# Patient Record
Sex: FEMALE | Race: WHITE | NOT HISPANIC OR LATINO | Employment: FULL TIME | URBAN - METROPOLITAN AREA
[De-identification: names, ages, dates, MRNs, and addresses within clinical notes are randomized per-mention and may not be internally consistent; named-entity substitution may affect disease eponyms.]

---
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
## 2025-01-01 ENCOUNTER — HOSPITAL ENCOUNTER (EMERGENCY)
Facility: HOSPITAL | Age: 20
Discharge: HOME | End: 2025-01-01
Attending: EMERGENCY MEDICINE
Payer: MEDICAID

## 2025-01-01 VITALS
DIASTOLIC BLOOD PRESSURE: 59 MMHG | BODY MASS INDEX: 25.71 KG/M2 | RESPIRATION RATE: 18 BRPM | HEIGHT: 66 IN | OXYGEN SATURATION: 99 % | SYSTOLIC BLOOD PRESSURE: 103 MMHG | HEART RATE: 67 BPM | WEIGHT: 160 LBS | TEMPERATURE: 98 F

## 2025-01-01 DIAGNOSIS — N30.00 ACUTE CYSTITIS WITHOUT HEMATURIA: Primary | ICD-10-CM

## 2025-01-01 LAB
B-HCG UR QL: NEGATIVE
BACTERIA #/AREA URNS AUTO: ABNORMAL /HPF
BILIRUB UR QL STRIP.AUTO: ABNORMAL
CLARITY UR: ABNORMAL
COLOR UR AUTO: YELLOW
GLUCOSE UR QL STRIP: NEGATIVE
HGB UR QL STRIP: ABNORMAL
HYALINE CASTS URNS QL MICRO: ABNORMAL /LPF
KETONES UR QL STRIP: ABNORMAL
LEUKOCYTE ESTERASE UR QL STRIP: ABNORMAL
NITRITE UR QL STRIP: NEGATIVE
PH UR STRIP: 6 [PH]
PROT UR QL STRIP: 30
RBC #/AREA URNS AUTO: ABNORMAL /HPF
SP GR UR STRIP.AUTO: >=1.03 (ref 1–1.03)
SQUAMOUS #/AREA URNS AUTO: ABNORMAL /HPF
UROBILINOGEN UR STRIP-ACNC: 0.2
WBC #/AREA URNS AUTO: ABNORMAL /HPF
YEAST UR QL AUTO: ABNORMAL /HPF

## 2025-01-01 PROCEDURE — 81025 URINE PREGNANCY TEST: CPT | Performed by: EMERGENCY MEDICINE

## 2025-01-01 PROCEDURE — 99283 EMERGENCY DEPT VISIT LOW MDM: CPT

## 2025-01-01 PROCEDURE — 81003 URINALYSIS AUTO W/O SCOPE: CPT | Performed by: EMERGENCY MEDICINE

## 2025-01-01 PROCEDURE — 87086 URINE CULTURE/COLONY COUNT: CPT | Performed by: EMERGENCY MEDICINE

## 2025-01-01 RX ORDER — SULFAMETHOXAZOLE AND TRIMETHOPRIM 800; 160 MG/1; MG/1
1 TABLET ORAL 2 TIMES DAILY
Qty: 10 TABLET | Refills: 0 | Status: SHIPPED | OUTPATIENT
Start: 2025-01-01 | End: 2025-01-06

## 2025-01-01 NOTE — ED PROVIDER NOTES
NAME:  Maria E Alexander  CSN:     [id number]  MRN:    [id number]  ADMIT DATE: 1/1/2025        eMERGENCY dEPARTMENT eNCOUnter    CHIEF COMPLAINT    Chief Complaint   Patient presents with    Urinary Frequency     Started today.        HPI      Maria E Alexander is a 19 y.o. female who presents to the ED for urinary frequency.  Symptoms started today.  No fevers vomiting or back pain          ALLERGIES    Review of patient's allergies indicates:  No Known Allergies    PAST MEDICAL HISTORY  Past Medical History:   Diagnosis Date    Scoliosis        SURGICAL HISTORY    Past Surgical History:   Procedure Laterality Date    WISDOM TOOTH EXTRACTION         SOCIAL HISTORY    Social History     Socioeconomic History    Marital status: Single   Tobacco Use    Smoking status: Never    Smokeless tobacco: Never   Substance and Sexual Activity    Alcohol use: Never    Drug use: Never     Social Drivers of Health     Financial Resource Strain: Low Risk  (11/22/2024)    Overall Financial Resource Strain (CARDIA)     Difficulty of Paying Living Expenses: Not very hard   Food Insecurity: No Food Insecurity (11/22/2024)    Hunger Vital Sign     Worried About Running Out of Food in the Last Year: Never true     Ran Out of Food in the Last Year: Never true   Physical Activity: Sufficiently Active (11/22/2024)    Exercise Vital Sign     Days of Exercise per Week: 7 days     Minutes of Exercise per Session: 90 min   Stress: No Stress Concern Present (11/22/2024)    [employer] - Occupational Stress Questionnaire     Feeling of Stress : Only a little   Housing Stability: Unknown (11/22/2024)    Housing Stability Vital Sign     Unable to Pay for Housing in the Last Year: No       FAMILY HISTORY    No family history on file.    REVIEW OF SYSTEMS   ROS  All Systems otherwise negative except as noted in the History of Present Illness.        PHYSICAL EXAM    Reviewed Triage Note  VITAL SIGNS:   ED Triage Vitals  "[01/01/25 0855]   Encounter Vitals Group      BP (!) 103/59      Systolic BP Percentile       Diastolic BP Percentile       Pulse 67      Resp 18      Temp 98.1 °F (36.7 °C)      Temp src       SpO2 99 %      Weight 160 lb      Height 5' 6"      Head Circumference       Peak Flow       Pain Score       Pain Loc       Pain Education       Exclude from Growth Chart        Patient Vitals for the past 24 hrs:   BP Temp Pulse Resp SpO2 Height Weight   01/01/25 0855 (!) 103/59 98.1 °F (36.7 °C) 67 18 99 % 5' 6" (1.676 m) 72.6 kg (160 lb)           Physical Exam    Constitutional:  Well-developed, well-nourished. No acute distress  HENT:  Normocephalic, atraumatic.  Eyes:  EOMI. Conjunctiva normal without discharge.   Neck: Normal range of motion.No stridor. No meningismus.   Respiratory:  No respiratory distress, retractions, or conversational dyspnea. Cardiovascular:  Normal heart rate. No pitting lower extremity edema.   Musculoskeletal:  No gross deformity or limited range of motion of all major joints.   Integument:  Warm and dry. No rash.  Neurologic:  Normal motor function. No focal deficits noted. Alert and Interactive.  Psychiatric:  Affect normal. Mood normal.         LABS  Pertinent labs reviewed. (See chart for details)   Labs Reviewed   URINALYSIS, REFLEX TO URINE CULTURE - Abnormal       Result Value    Color, UA Yellow      Appearance, UA Slightly Cloudy (*)     Specific Gravity, UA >=1.030      pH, UA 6.0      Protein, UA 30 (*)     Glucose, UA Negative      Ketones, UA Trace (*)     Blood, UA Moderate (*)     Bilirubin, UA Small (*)     Urobilinogen, UA 0.2      Nitrites, UA Negative      Leukocyte Esterase, UA Small (*)    URINALYSIS, MICROSCOPIC - Abnormal    Bacteria, UA Many (*)     Hyaline Casts, UA Occasional (*)     Yeast, UA Rare (*)     RBC, UA 6-10 (*)     WBC, UA 21-50 (*)     Squamous Epithelial Cells, UA Many (*)    PREGNANCY TEST, URINE RAPID - Normal    hCG Qualitative, Urine Negative   "   CULTURE, URINE         RADIOLOGY    Imaging Results    None         PROCEDURES    Procedures      EKG     Interpreted by ERP:         ED COURSE & MEDICAL DECISION MAKING    Pertinent & Imaging studies reviewed. (See chart for details and specific orders.)        Medications - No data to display              DISPOSITION  Patient discahrged in stable condition at No discharge date for patient encounter.      DISCHARGE INSTRUCTIONS & MEDS       Medication List        START taking these medications      sulfamethoxazole-trimethoprim 800-160mg 800-160 mg Tab  Commonly known as: BACTRIM DS  Take 1 tablet by mouth 2 (two) times daily. for 5 days            ASK your doctor about these medications      hyoscyamine 0.125 mg Tab  Commonly known as: ANASPAZ,LEVSIN  Take 1 tablet (125 mcg total) by mouth every 4 (four) hours as needed (Abdominal cramping).               Where to Get Your Medications        These medications were sent to [facility] [address] [place] [id number]      Phone: [phone]   sulfamethoxazole-trimethoprim 800-160mg 800-160 mg Tab           New Prescriptions    SULFAMETHOXAZOLE-TRIMETHOPRIM 800-160MG (BACTRIM DS) 800-160 MG TAB    Take 1 tablet by mouth 2 (two) times daily. for 5 days           FINAL IMPRESSION    1. Acute cystitis without hematuria              Blood Pressure Follow-Up Advised  Patient advised to follow up with PCP within 3-5 days for blood pressure re-check if blood pressure is equal to or greater than 120/80.         Critical care time spent with this patient (not including separately billable items) was  0 minutes.     DISCLAIMER: This note was prepared with Dragon NaturallySpeaking voice recognition transcription software. Garbled syntax, mangled pronouns, and other bizarre constructions may be attributed to that software system.      Chung Zazueta MD  01/01/2025  9:41 AM           Chung Zazueta MD  01/01/25  [id number]

## 2025-01-02 LAB
BACTERIA UR CULT: ABNORMAL

## 2025-01-04 ENCOUNTER — PATIENT MESSAGE (OUTPATIENT)
Dept: FAMILY MEDICINE | Facility: CLINIC | Age: 20
End: 2025-01-04
Payer: MEDICAID

## 2025-02-06 ENCOUNTER — OFFICE VISIT (OUTPATIENT)
Dept: FAMILY MEDICINE | Facility: CLINIC | Age: 20
End: 2025-02-06
Payer: MEDICAID

## 2025-02-06 VITALS
WEIGHT: 161 LBS | SYSTOLIC BLOOD PRESSURE: 108 MMHG | TEMPERATURE: 98 F | HEIGHT: 66 IN | HEART RATE: 85 BPM | BODY MASS INDEX: 25.88 KG/M2 | OXYGEN SATURATION: 99 % | DIASTOLIC BLOOD PRESSURE: 72 MMHG | RESPIRATION RATE: 20 BRPM

## 2025-02-06 DIAGNOSIS — Z11.3 SCREENING FOR STDS (SEXUALLY TRANSMITTED DISEASES): ICD-10-CM

## 2025-02-06 DIAGNOSIS — Z30.011 ENCOUNTER FOR INITIAL PRESCRIPTION OF CONTRACEPTIVE PILLS: ICD-10-CM

## 2025-02-06 DIAGNOSIS — N89.8 VAGINAL DISCHARGE: Primary | ICD-10-CM

## 2025-02-06 DIAGNOSIS — J06.9 VIRAL URI: ICD-10-CM

## 2025-02-06 LAB
B-HCG UR QL: NEGATIVE
C TRACH DNA SPEC QL NAA+PROBE: NOT DETECTED
CLUE CELLS VAG QL WET PREP: ABNORMAL
CTP QC/QA: YES
HBV SURFACE AG SERPL QL IA: NONREACTIVE
HCV AB SERPL QL IA: NONREACTIVE
HIV 1+2 AB+HIV1 P24 AG SERPL QL IA: NONREACTIVE
N GONORRHOEA DNA SPEC QL NAA+PROBE: NOT DETECTED
SOURCE (OHS): NORMAL
T PALLIDUM AB SER QL: NONREACTIVE
T VAGINALIS VAG QL WET PREP: ABNORMAL
WBC #/AREA VAG WET PREP: ABNORMAL
YEAST SPEC QL WET PREP: ABNORMAL

## 2025-02-06 PROCEDURE — 87591 N.GONORRHOEAE DNA AMP PROB: CPT

## 2025-02-06 PROCEDURE — 86803 HEPATITIS C AB TEST: CPT

## 2025-02-06 PROCEDURE — 87210 SMEAR WET MOUNT SALINE/INK: CPT

## 2025-02-06 PROCEDURE — 87340 HEPATITIS B SURFACE AG IA: CPT

## 2025-02-06 PROCEDURE — 81025 URINE PREGNANCY TEST: CPT | Mod: PBBFAC

## 2025-02-06 PROCEDURE — 87389 HIV-1 AG W/HIV-1&-2 AB AG IA: CPT

## 2025-02-06 PROCEDURE — 86780 TREPONEMA PALLIDUM: CPT

## 2025-02-06 PROCEDURE — 99214 OFFICE O/P EST MOD 30 MIN: CPT | Mod: PBBFAC

## 2025-02-06 RX ORDER — NORGESTIMATE AND ETHINYL ESTRADIOL 7DAYSX3 LO
1 KIT ORAL DAILY
Qty: 30 TABLET | Refills: 11 | Status: SHIPPED | OUTPATIENT
Start: 2025-02-06 | End: 2026-02-06

## 2025-02-06 NOTE — PATIENT INSTRUCTIONS
You may take Sudafed as instructed per instructions in the box at the pharmacy for your symptoms.   Flonase nasal spray over-the-counter until symptoms resolve.

## 2025-02-06 NOTE — PROGRESS NOTES
"[facility] OFFICE VISIT NOTE  MRN: [id number]  Date: 02/06/2025    Chief Complaint: Sinus Problem (Onset 2 weeks) and Follow-up      Subjective:    HPI  Maria E Alexander is a 19 y.o. female  presenting to [facility] for :    Nasal congestion:  - started with sore throat about 12 days ago which resolved  - stuffy nose with ear fullness  - denies nausea, vomiting, diarrhea, body aches, chills  - he has not taken any medications over-the-counter for these symptoms.    STD screening:  Patient also requesting a full STD panel today.  States that all of her previous UTI symptoms from her last ER on 01/01/2025 visit that were treated with Bactrim have resolved.  She denies dysuria, hematuria, vaginal lesions, vaginal itching, or known STD exposure.  States she still has some vaginal discharge that is clear and would like a wet prep also to test for BV.    Contraception:  Patient is also requesting to be started on birth control at this time.  Patient has taken OCPs in the past but does not recall the name of the pill.  Patient is not currently sexually active but plans to become sexually active. Denies history of blood clots (DVT or PE), hypertension, and admits to infrequently vaping nicotine.   LMP: 1/20/25-1/24/25; denies skip periods        ROS per HPI above.      Current Medications:   Outpatient Medications as of 2/6/2025   Medication Sig Dispense Refill    norgestimate-ethinyl estradioL (ORTHO TRI-CYCLEN LO) 0.18/0.215/0.25 mg-25 mcg tablet Take 1 tablet by mouth once daily. 30 tablet 11     No current facility-administered medications on file as of 2/6/2025.        Objective:  Vitals:    02/06/25 1534   BP: 108/72   BP Location: Right arm   Patient Position: Sitting   Pulse: 85   Resp: 20   Temp: 98.4 °F (36.9 °C)   TempSrc: Oral   SpO2: 99%   Weight: 73 kg (161 lb)   Height: 5' 6" (1.676 m)       Physical Exam  Constitutional:       General: She is not in acute distress.     Appearance: She is not ill-appearing. "   HENT:      Right Ear: Ear canal and external ear normal.      Left Ear: Ear canal and external ear normal.      Ears:      Comments: Bilateral effusions     Nose: Rhinorrhea present. No congestion.      Mouth/Throat:      Mouth: Mucous membranes are moist.      Pharynx: No oropharyngeal exudate or posterior oropharyngeal erythema.   Eyes:      General: No scleral icterus.     Extraocular Movements: Extraocular movements intact.      Conjunctiva/sclera: Conjunctivae normal.   Cardiovascular:      Rate and Rhythm: Normal rate and regular rhythm.   Pulmonary:      Effort: Pulmonary effort is normal. No respiratory distress.      Breath sounds: No wheezing.   Abdominal:      General: Bowel sounds are normal. There is no distension.      Palpations: Abdomen is soft.      Tenderness: There is no abdominal tenderness. There is no guarding.   Genitourinary:     Comments: Nurse was present for duration of pelvic exam:  No external vulvar abnormalities or lesions.  Vaginal walls without bleeding or lesions.  Cervix visualized pink and firm without lesions.  Scant opaque thin discharge in vaginal vault without bleeding.  Musculoskeletal:      Cervical back: Normal range of motion. No tenderness.   Lymphadenopathy:      Cervical: No cervical adenopathy.   Neurological:      Mental Status: She is alert.      Gait: Gait normal.           Assessment/ Plan:    Vaginal discharge  Screening for STDs (sexually transmitted diseases)  - HIV 1/2 Ag/Ab (4th Gen), SYPHILIS ANTIBODY (WITH REFLEX RPR), Hepatitis C Antibody, Hepatitis B Surface Antigen, Chlamydia/GC, PCR, and Wet Prep, Genital ordered and will call patient with results.will follow up and treat as indicated.    Encounter for initial prescription of contraceptive pills  - POCT urine pregnancy negative at today's, 2/6/25, visit  - START norgestimate-ethinyl estradioL (ORTHO TRI-CYCLEN LO) 0.18/0.215/0.25 mg-25 mcg tablet; Take 1 tablet by mouth once daily.  Dispense: 30  tablet; Refill: 11. Risks and benefits of medication discussed with patient regarding hypercoagulability and hypertension and patient is agreeable to initiating OCP at this time.    Viral URI  - patient informed that she may use OTC Sudafed if needed for her mild symptoms and hat she may use flonase PRN ; patient agrees with this plan. RTC precautions discussed with patient who is agreeable.    Follow up in about 1 year (around 2/6/2026).           Adrienne Wiggins DO  [employer]  Resident, [facility]

## 2025-02-07 ENCOUNTER — TELEPHONE (OUTPATIENT)
Dept: FAMILY MEDICINE | Facility: CLINIC | Age: 20
End: 2025-02-07
Payer: MEDICAID

## 2025-02-07 RX ORDER — METRONIDAZOLE 500 MG/1
500 TABLET ORAL EVERY 12 HOURS
Qty: 14 TABLET | Refills: 0 | Status: SHIPPED | OUTPATIENT
Start: 2025-02-07 | End: 2025-02-14

## 2025-02-07 RX ORDER — FLUCONAZOLE 150 MG/1
150 TABLET ORAL DAILY
Qty: 1 TABLET | Refills: 0 | Status: SHIPPED | OUTPATIENT
Start: 2025-02-07 | End: 2025-02-08

## 2025-02-07 NOTE — TELEPHONE ENCOUNTER
Spoke to patient over the phone regarding her lab results.  Wet prep indicative bacterial vaginosis and yeast noted on previous UA done in ER which patient was not treated for.  Patient agreeable to oral medications for treatment. Will initiate metronidazole 500 mg b.i.d. for 7 days and Diflucan 150 mg x1 dose.       Adrienne Wiggins DO

## 2025-02-08 NOTE — PROGRESS NOTES
I reviewed History, PE, A/P and chart was reviewed.  Services provided in the outpatient department of  a teaching facility, I was immediately available.  I agree with resident, care reasonable and necessary with any exceptions stated below.  Management discussed with resident at time of visit.    Advised that OCP should best be started with onset of next nl menses  Denies sex activity    Martina Lopez MD  [facility] - [facility]  [facility]

## 2025-04-06 ENCOUNTER — PATIENT MESSAGE (OUTPATIENT)
Dept: FAMILY MEDICINE | Facility: CLINIC | Age: 20
End: 2025-04-06
Payer: MEDICAID

## 2025-04-07 ENCOUNTER — OFFICE VISIT (OUTPATIENT)
Dept: FAMILY MEDICINE | Facility: CLINIC | Age: 20
End: 2025-04-07
Payer: MEDICAID

## 2025-04-07 VITALS
BODY MASS INDEX: 26.62 KG/M2 | HEART RATE: 75 BPM | HEIGHT: 66 IN | DIASTOLIC BLOOD PRESSURE: 80 MMHG | WEIGHT: 165.63 LBS | SYSTOLIC BLOOD PRESSURE: 120 MMHG | OXYGEN SATURATION: 100 % | TEMPERATURE: 98 F

## 2025-04-07 DIAGNOSIS — Z30.41 ENCOUNTER FOR SURVEILLANCE OF CONTRACEPTIVE PILLS: Primary | ICD-10-CM

## 2025-04-07 DIAGNOSIS — Z32.01 POSITIVE PREGNANCY TEST: ICD-10-CM

## 2025-04-07 LAB
B-HCG UR QL: NEGATIVE
CTP QC/QA: YES

## 2025-04-07 PROCEDURE — 81025 URINE PREGNANCY TEST: CPT | Mod: PBBFAC

## 2025-04-07 PROCEDURE — 99213 OFFICE O/P EST LOW 20 MIN: CPT | Mod: PBBFAC

## 2025-04-07 NOTE — PROGRESS NOTES
"[facility] OFFICE VISIT NOTE  MRN: [id number]  Date: 04/07/2025    Chief Complaint: UPT    Subjective:    HPI  Maria E Alexander is a 20 y.o. female  presenting to [facility] for confirmatory UPT.  Patient had 3 positive urine pregnancy tests on 04/05/2025 and single positive test on 4/4/25. She reports nausea without vomiting starting about 1 week ago which prompted her to take the pregnancy tests at home. She denies missed periods and has some mild stomach cramping that is intermittent. Denies diarrhea, fever, constipation, dysuria, hematuria, vaginal itching, or vaginal burning.     LMP: 3/11/25 started lasted 4 days and was lighter than usual.  And was  on 2/20/25 prior.   Started OCPs in Feb 2025 starting at time of menstrual cycle but stopped after about 1 week of pills because she missed on day. Has not taken OCP since end of Feb 2025.    ROS per HPI above.      Outpatient Medications as of 4/7/2025   Medication Sig Dispense Refill    norgestimate-ethinyl estradioL (ORTHO TRI-CYCLEN LO) 0.18/0.215/0.25 mg-25 mcg tablet Take 1 tablet by mouth once daily. 30 tablet 11     No current facility-administered medications on file as of 4/7/2025.        Objective:  Vitals:    04/07/25 1605   BP: 120/80   BP Location: Right arm   Patient Position: Sitting   Pulse: 75   Temp: 98.4 °F (36.9 °C)   TempSrc: Oral   SpO2: 100%   Weight: 75.1 kg (165 lb 9.6 oz)   Height: 5' 5.98" (1.676 m)       Physical Exam  Constitutional:       General: She is not in acute distress.     Appearance: She is not ill-appearing.   Cardiovascular:      Rate and Rhythm: Normal rate and regular rhythm.   Pulmonary:      Effort: Pulmonary effort is normal. No respiratory distress.      Breath sounds: No wheezing.   Abdominal:      General: Bowel sounds are normal. There is no distension.      Palpations: Abdomen is soft. There is no mass.      Tenderness: There is no abdominal tenderness. There is no right CVA tenderness, left CVA tenderness, guarding " or rebound.   Musculoskeletal:      Cervical back: Normal range of motion. No rigidity.      Right lower leg: No edema.      Left lower leg: No edema.   Skin:     General: Skin is warm.      Findings: No rash.   Neurological:      Mental Status: She is alert.       Assessment/ Plan:    Encounter for surveillance of contraceptive pills  Positive home pregnancy test  - POCT Urine Pregnancy negative today in clinic despite positive home pregnancy tests  - differentials discussed with patient including possibility that urine POCT test done in clinic today possibly false negative.   - will collect serum beta HCG, Quantitative to be collected in [facility] lab in AM; patient understands and agreeable to getting lab work done to confirm status of possible pregnancy.  - patient was instructed to hold her OCP until lab work beta HCG results following day; will call patient with results and schedule follow up as needed based on results. Patient desires to remain on OCP. Discussed need for med adherence and condom use to prevent spread of STIs and avoid possible pregnancy if dose of medication/OCP is missed or skipped.   - ER precautions discussed with patient in depth who voices her understanding at this time       Follow up for annual follow up., or earlier based on lab results of quant beta HCG.      Adrienne Wiggins DO  [employer]  Resident, [facility]

## 2025-04-08 ENCOUNTER — LAB VISIT (OUTPATIENT)
Dept: LAB | Facility: HOSPITAL | Age: 20
End: 2025-04-08
Payer: MEDICAID

## 2025-04-08 ENCOUNTER — TELEPHONE (OUTPATIENT)
Dept: FAMILY MEDICINE | Facility: CLINIC | Age: 20
End: 2025-04-08
Payer: MEDICAID

## 2025-04-08 DIAGNOSIS — R11.0 NAUSEA: Primary | ICD-10-CM

## 2025-04-08 DIAGNOSIS — Z32.01 POSITIVE PREGNANCY TEST: ICD-10-CM

## 2025-04-08 DIAGNOSIS — Z32.01 POSITIVE URINE PREGNANCY TEST: ICD-10-CM

## 2025-04-08 LAB — B-HCG FREE SERPL-ACNC: 96.69 MIU/ML

## 2025-04-08 PROCEDURE — 84702 CHORIONIC GONADOTROPIN TEST: CPT

## 2025-04-08 PROCEDURE — 36415 COLL VENOUS BLD VENIPUNCTURE: CPT

## 2025-04-08 RX ORDER — PRENATAL WITH FERROUS FUM AND FOLIC ACID 3080; 920; 120; 400; 22; 1.84; 3; 20; 10; 1; 12; 200; 27; 25; 2 [IU]/1; [IU]/1; MG/1; [IU]/1; MG/1; MG/1; MG/1; MG/1; MG/1; MG/1; UG/1; MG/1; MG/1; MG/1; MG/1
1 TABLET ORAL DAILY
Qty: 30 TABLET | Refills: 11 | Status: SHIPPED | OUTPATIENT
Start: 2025-04-08 | End: 2026-04-08

## 2025-04-08 NOTE — PROGRESS NOTES
I reviewed History, PE, A/P and chart was reviewed.  Services provided in the outpatient department of  a teaching facility, I was immediately available.  I agree with resident, care reasonable and necessary with any exceptions stated below.  Management discussed with resident at time of visit.    Martina Lopez MD  [facility] - [facility]  [facility]

## 2025-04-08 NOTE — TELEPHONE ENCOUNTER
Spoke with patient regarding HCG quant results; patient agreeable to OB U/S to be done outpatient at [facility] as soon as possible this week. Patient instructed to call clinic if not scheduled by end of this week, 4/8/25. Will repeat beta HCG in 1 week and follow until at 0. Will continue to closely monitor. Prenatal vitamin ordered to patient's pharmacy to be cautious until U/S is complete.     Adrienne Wiggins, DO  [facility], [facility]  4/8/25 @ 16:50

## 2025-04-11 ENCOUNTER — PATIENT MESSAGE (OUTPATIENT)
Dept: FAMILY MEDICINE | Facility: CLINIC | Age: 20
End: 2025-04-11
Payer: MEDICAID

## 2025-04-28 ENCOUNTER — HOSPITAL ENCOUNTER (EMERGENCY)
Facility: HOSPITAL | Age: 20
Discharge: HOME | End: 2025-04-28
Attending: EMERGENCY MEDICINE
Payer: MEDICAID

## 2025-04-28 VITALS
TEMPERATURE: 98 F | HEIGHT: 65 IN | OXYGEN SATURATION: 99 % | HEART RATE: 67 BPM | RESPIRATION RATE: 18 BRPM | WEIGHT: 164 LBS | SYSTOLIC BLOOD PRESSURE: 106 MMHG | BODY MASS INDEX: 27.32 KG/M2 | DIASTOLIC BLOOD PRESSURE: 60 MMHG

## 2025-04-28 DIAGNOSIS — O20.9 VAGINAL BLEEDING AFFECTING EARLY PREGNANCY: ICD-10-CM

## 2025-04-28 DIAGNOSIS — O20.0 MISCARRIAGE, THREATENED, EARLY PREGNANCY: Primary | ICD-10-CM

## 2025-04-28 LAB
B-HCG FREE SERPL-ACNC: ABNORMAL MIU/ML
HOLD SPECIMEN: NORMAL

## 2025-04-28 PROCEDURE — 99284 EMERGENCY DEPT VISIT MOD MDM: CPT | Mod: 25

## 2025-04-28 PROCEDURE — 84702 CHORIONIC GONADOTROPIN TEST: CPT | Performed by: EMERGENCY MEDICINE

## 2025-04-28 NOTE — DISCHARGE INSTRUCTIONS
"  Ultrasound is normal and shows a single viable pregnancy in the normal location at 7 weeks 0 days with no sign of bleeding or other complication.      Rest, follow routine threatened miscarriage" instructions and let your OB know about this episode/ ultrasound.      No other specific recommendations or prescriptions needed.    Follow all other routine prenatal care as per your OB/ gyn recommendations.      "

## 2025-04-28 NOTE — ED PROVIDER NOTES
"Encounter Date: 4/28/2025       History     Chief Complaint   Patient presents with    Vaginal Bleeding     States that she is 7 weeks pregnant and has had light pink spotting since last Thursday. States it is "not heavy" and notices when she wipes after urination. Denies abdominal pain of cramping. Next OBGYN appt May 21.      First pregnancy, estimated 7 weeks tomorrow, already established with OB and next appointment is in 4 weeks.  She had an early ultrasound that showed an intrauterine sac but no other known detail.  Minor spotting and slight pink on wiping on and off over the last 3 or 4 days without pain, cramping, urinary symptoms, or other complaints.  Single dose azithromycin treatment for chlamydia about 10 days ago.  Generally healthy.  No pain or bleeding at present.  No other complaints.    The history is provided by the patient. No  was used.     Review of patient's allergies indicates:  No Known Allergies  Past Medical History:   Diagnosis Date    Scoliosis      Past Surgical History:   Procedure Laterality Date    WISDOM TOOTH EXTRACTION       No family history on file.  Social History[1]  Review of Systems   Genitourinary:  Positive for vaginal bleeding. Negative for difficulty urinating, dysuria and flank pain.       Physical Exam     Initial Vitals [04/28/25 0851]   BP Pulse Resp Temp SpO2   (!) 112/59 63 18 97.8 °F (36.6 °C) 100 %      MAP       --         Physical Exam    Nursing note and vitals reviewed.  Constitutional: She appears well-developed and well-nourished. She is not diaphoretic. No distress.   HENT:   Head: Normocephalic and atraumatic. Mouth/Throat: No oropharyngeal exudate.   Eyes: Conjunctivae and EOM are normal. Pupils are equal, round, and reactive to light. Right eye exhibits no discharge. Left eye exhibits no discharge. No scleral icterus.   Neck: Neck supple. No thyromegaly present. No tracheal deviation present. No JVD present.   Normal range of " motion.  Cardiovascular:  Normal rate, regular rhythm, normal heart sounds and intact distal pulses.     Exam reveals no gallop and no friction rub.       No murmur heard.  Pulmonary/Chest: Breath sounds normal. No stridor. No respiratory distress. She has no wheezes. She has no rhonchi. She has no rales. She exhibits no tenderness.   Abdominal: Abdomen is soft. Bowel sounds are normal. She exhibits no distension and no mass. There is no abdominal tenderness. There is no rebound and no guarding.   Musculoskeletal:         General: No tenderness or edema. Normal range of motion.      Cervical back: Normal range of motion and neck supple.     Neurological: She is alert and oriented to person, place, and time. She has normal strength.   Skin: Skin is warm and dry. No rash and no abscess noted. No erythema.   Psychiatric: She has a normal mood and affect. Her behavior is normal. Judgment and thought content normal.         ED Course   Procedures  Labs Reviewed   HCG, QUANTITATIVE - Abnormal       Result Value    Beta HCG Quant 65,204.01 (*)    EXTRA TUBES    Narrative:     The following orders were created for panel order EXTRA TUBES.  Procedure                               Abnormality         Status                     ---------                               -----------         ------                     Light Blue Top Hold[[id number]]                             Final result               Lavender Top Hold[[id number]]                               Final result               Pink Top Hold[[id number]]                                   Final result                 Please view results for these tests on the individual orders.   LIGHT BLUE TOP HOLD    Extra Tube Hold for add-ons.     LAVENDER TOP HOLD    Extra Tube Hold for add-ons.     PINK TOP HOLD    Extra Tube Hold for add-ons.            Imaging Results              US OB <14 Wks TransAbd & TransVag, Single Gestation (XPD) (Final result)  Result time 04/28/25  11:58:54   Procedure changed from US OB <14 Wks, TransAbd, Single Gestation     Final result by Yoel Reyes MD (04/28/25 11:58:54)                   Narrative:    EXAMINATION  US OB <14 WEEKS, TRANSABDOM & TRANSVAG, SINGLE GESTATION (XPD)    CLINICAL HISTORY  *Vaginal bleeding; Hemorrhage in early pregnancy, unspecified  *LMP: 11 March 2025 (6 weeks, 6 days)    TECHNIQUE  Limited transabdominal and endovaginal grayscale sonographic Doppler evaluation of the uterus and adjacent pelvic structures was performed.    COMPARISON  No comparison imaging pertinent to the current gestation available at the time of initial interpretation.    FINDINGS  Exam quality: adequate for evaluation    The uterus is unremarkable size, with single ovoid intrauterine gestational sac visualized. Volume of amniotic fluid and gestational sac shape are of grossly normal appearance for stage of pregnancy.  No evidence of acute subchorionic hemorrhage is appreciated.    Fetal pole and yolk sac are identified.  Crown-rump length measures average of 0.93 cm, corresponding to estimated gestational age of 7 weeks, 0 days (+/-0 weeks, 4 days).  Fetal cardiac motion is present, with rate of 128-130 bpm.    No concerning adnexal mass or large cystic abnormality is appreciated.  The ovaries are sonographically normal for patient age and gestational status. Doppler interrogation of both ovaries demonstrates grossly maintained blood flow.    No free pelvic fluid is identified.   The visualized urinary bladder is normal in appearance.    IMPRESSION  1. Single live intrauterine gestation with no convincing acute abnormality.  2. No suspicious adnexal findings.      Electronically signed by: Yoel Reyes  Date:    04/28/2025  Time:    11:58                                     Medications - No data to display    12:06 PM Stable, counseled, pelvic exam not indicated,    Reassured, discharge with the following instructions:  Ultrasound is normal and shows  "a single viable pregnancy in the normal location at 7 weeks 0 days with no sign of bleeding or other complication.      Rest, follow routine threatened miscarriage" instructions and let your OB know about this episode/ ultrasound.      No other specific recommendations or prescriptions needed.    Follow all other routine prenatal care as per your OB/ gyn recommendations.      Medical Decision Making  Very minor vaginal bleeding without other symptoms at 6 or 7 weeks gestation, 1st pregnancy, no complications.  Exam, data, and general course are all reassuring, appropriate for routine expectant outpatient management.    Problems Addressed:  Miscarriage, threatened, early pregnancy: acute illness or injury  Vaginal bleeding affecting early pregnancy: acute illness or injury    Amount and/or Complexity of Data Reviewed  Labs: ordered. Decision-making details documented in ED Course.  Radiology: ordered. Decision-making details documented in ED Course.      Additional MDM:   Differential Diagnosis:   Threatened miscarriage, healthy pregnancy, subchorionic hemorrhage among others                                    Clinical Impression:  Final diagnoses:  [O20.9] Vaginal bleeding affecting early pregnancy  [O20.0] Miscarriage, threatened, early pregnancy (Primary)          ED Disposition Condition    Discharge Stable          ED Prescriptions    None       Follow-up Information       Follow up With Specialties Details Why Contact Info    [facility] - Emergency Dept Emergency Medicine  As needed [address] [address] [facility] [id number] [phone]    Adrienne Wiggins,  Family Medicine  As needed [address][1] [address]  [phone]                   [1]   Social History  Tobacco Use    Smoking status: Never    Smokeless tobacco: Never   Substance Use Topics    Alcohol use: Never    Drug use: Never        Dani Bailey MD  04/28/25 1207    "

## 2025-04-28 NOTE — Clinical Note
"Maria E Vincent" Benjamin was seen and treated in our emergency department on 4/28/2025.  She may return to work on 04/30/2025.       If you have any questions or concerns, please don't hesitate to call.       RN    "

## 2025-05-16 ENCOUNTER — OFFICE VISIT (OUTPATIENT)
Dept: FAMILY MEDICINE | Facility: CLINIC | Age: 20
End: 2025-05-16
Payer: MEDICAID

## 2025-05-16 VITALS
SYSTOLIC BLOOD PRESSURE: 115 MMHG | TEMPERATURE: 99 F | RESPIRATION RATE: 20 BRPM | WEIGHT: 162.81 LBS | OXYGEN SATURATION: 100 % | DIASTOLIC BLOOD PRESSURE: 77 MMHG | HEIGHT: 65 IN | BODY MASS INDEX: 27.13 KG/M2 | HEART RATE: 83 BPM

## 2025-05-16 DIAGNOSIS — B96.89 BACTERIAL VAGINOSIS IN PREGNANCY: Primary | ICD-10-CM

## 2025-05-16 DIAGNOSIS — O23.599 BACTERIAL VAGINOSIS IN PREGNANCY: Primary | ICD-10-CM

## 2025-05-16 DIAGNOSIS — N89.8 VAGINAL ODOR: ICD-10-CM

## 2025-05-16 DIAGNOSIS — Z34.01 PRENATAL CARE, FIRST PREGNANCY IN FIRST TRIMESTER: ICD-10-CM

## 2025-05-16 LAB
BACTERIA #/AREA URNS AUTO: ABNORMAL /HPF
BILIRUB UR QL STRIP.AUTO: NEGATIVE
CLARITY UR: CLEAR
CLUE CELLS VAG QL WET PREP: ABNORMAL
COLOR UR AUTO: YELLOW
GLUCOSE UR QL STRIP: NORMAL
HGB UR QL STRIP: NEGATIVE
HYALINE CASTS #/AREA URNS LPF: ABNORMAL /LPF
KETONES UR QL STRIP: NEGATIVE
LEUKOCYTE ESTERASE UR QL STRIP: NEGATIVE
MUCOUS THREADS URNS QL MICRO: ABNORMAL /LPF
NITRITE UR QL STRIP: NEGATIVE
PH UR STRIP: 6.5 [PH]
PROT UR QL STRIP: NEGATIVE
RBC #/AREA URNS AUTO: ABNORMAL /HPF
SP GR UR STRIP.AUTO: 1.02 (ref 1–1.03)
SQUAMOUS #/AREA URNS LPF: ABNORMAL /HPF
T VAGINALIS VAG QL WET PREP: ABNORMAL
UROBILINOGEN UR STRIP-ACNC: NORMAL
WBC #/AREA URNS AUTO: ABNORMAL /HPF
WBC #/AREA VAG WET PREP: ABNORMAL
YEAST SPEC QL WET PREP: ABNORMAL

## 2025-05-16 PROCEDURE — 87210 SMEAR WET MOUNT SALINE/INK: CPT

## 2025-05-16 PROCEDURE — 99214 OFFICE O/P EST MOD 30 MIN: CPT | Mod: PBBFAC

## 2025-05-16 PROCEDURE — 81001 URINALYSIS AUTO W/SCOPE: CPT

## 2025-05-17 RX ORDER — METRONIDAZOLE 500 MG/1
500 TABLET ORAL EVERY 12 HOURS
Qty: 14 TABLET | Refills: 0 | Status: SHIPPED | OUTPATIENT
Start: 2025-05-17 | End: 2025-05-24

## 2025-05-17 NOTE — PROGRESS NOTES
"[facility] OFFICE VISIT NOTE  MRN: [id number]  Date: 2025    Chief Complaint: PH Balance Issues  (Onset x a week states she's been having a vaginal smell.)      Subjective:    HPI  Maria E Alexander is a  20 y.o. female presenting to [facility] at estimated 10 weeks gestation  for :    Vaginal discharge and odor:  - present for about 7 days now  - denies vaginal bleeding/spotting, dysuria, hematuria, vainal itching, vaginal burning, external vaginal lesions/warts noted, urinary retention or incontinence   - currently following with outside OB for current pregnancy which patient states she is about 10 weeks along today and plans to start seeing Dr. Saleh during this pregnancy and would like a referral to this physician   - states she was recently treated for chlamydia at her OB office and has not been retested yet but has appt to got to OB on Tuesday, 25      ROS per HPI above.      Current Medications:   Outpatient Medications as of 2025   Medication Sig Dispense Refill    PNV,calcium 72/iron/folic acid (PRENATAL VITAMIN) Tab Take 1 tablet by mouth once daily. 30 tablet 11     No current facility-administered medications on file as of 2025.         Objective:  Vitals:    25 1555   BP: 115/77   BP Location: Right arm   Patient Position: Sitting   Pulse: 83   Resp: 20   Temp: 98.5 °F (36.9 °C)   TempSrc: Oral   SpO2: 100%   Weight: 73.8 kg (162 lb 12.8 oz)   Height: 5' 5" (1.651 m)       Physical Exam  Constitutional:       General: She is not in acute distress.     Appearance: She is not ill-appearing.   Cardiovascular:      Rate and Rhythm: Normal rate and regular rhythm.      Heart sounds: No murmur heard.  Pulmonary:      Effort: Pulmonary effort is normal. No respiratory distress.      Breath sounds: Normal breath sounds. No wheezing.   Abdominal:      General: There is no distension.      Palpations: Abdomen is soft.      Tenderness: There is no abdominal tenderness. There is no " guarding.   Genitourinary:     Comments: Normal external vaginal anatomy. No obvious discharge noted at introitus. No external vaginal warts or lesions noted.   Musculoskeletal:      Cervical back: Normal range of motion. No rigidity.   Lymphadenopathy:      Cervical: No cervical adenopathy.   Neurological:      Mental Status: She is alert.         Assessment/ Plan:    Bacterial Vaginosis in pregnancy   Vaginal odor  - Wet Prep, Genital ordered and completed at today's visit; will call patient will results   - resulted as rare clue cells and few WBC indicating likely BV infection   - Urinalysis, Reflex to Urine Culture Urine performed showing no blood or indication of acute urinary infection   - will order PO metronidazole 500 mg BID for 7 days duration; risks and benefits of oral medication treatment in pregnancy discussed with patient who agrees to treatment based on symptomatic BV infection in pregnancy.  - referral placed to Dr. Hadley Saleh MD with [facility] at patient's request for new OB physician       Follow up for 1 year follow up PRN .       Adrienne Wiggins DO  [employer]  Resident, HO-3

## 2025-05-22 ENCOUNTER — PATIENT MESSAGE (OUTPATIENT)
Dept: FAMILY MEDICINE | Facility: CLINIC | Age: 20
End: 2025-05-22
Payer: MEDICAID

## 2025-06-26 ENCOUNTER — PATIENT MESSAGE (OUTPATIENT)
Dept: FAMILY MEDICINE | Facility: CLINIC | Age: 20
End: 2025-06-26
Payer: MEDICAID

## 2025-07-03 ENCOUNTER — TELEPHONE (OUTPATIENT)
Dept: FAMILY MEDICINE | Facility: CLINIC | Age: 20
End: 2025-07-03
Payer: MEDICAID

## 2025-07-03 NOTE — TELEPHONE ENCOUNTER
Returned patient's call regarding finding a new OB physician.  Patient states she had found a new female physician at [facility].  Patient states she will call to schedule an appointment here if any further complications arise with finding a doctor.    Adrienne Wiggins DO  7/3/25 @ 15:31

## 2025-07-10 ENCOUNTER — PATIENT MESSAGE (OUTPATIENT)
Dept: FAMILY MEDICINE | Facility: CLINIC | Age: 20
End: 2025-07-10
Payer: MEDICAID

## 2025-07-15 ENCOUNTER — TELEPHONE (OUTPATIENT)
Dept: FAMILY MEDICINE | Facility: CLINIC | Age: 20
End: 2025-07-15

## 2025-07-15 ENCOUNTER — PATIENT MESSAGE (OUTPATIENT)
Dept: FAMILY MEDICINE | Facility: CLINIC | Age: 20
End: 2025-07-15
Payer: MEDICAID

## 2025-07-15 ENCOUNTER — OFFICE VISIT (OUTPATIENT)
Dept: FAMILY MEDICINE | Facility: CLINIC | Age: 20
End: 2025-07-15
Payer: MEDICAID

## 2025-07-15 ENCOUNTER — HOSPITAL ENCOUNTER (OUTPATIENT)
Dept: RADIOLOGY | Facility: HOSPITAL | Age: 20
Discharge: HOME | End: 2025-07-15
Attending: OBSTETRICS & GYNECOLOGY
Payer: MEDICAID

## 2025-07-15 VITALS
HEIGHT: 65 IN | OXYGEN SATURATION: 98 % | RESPIRATION RATE: 16 BRPM | BODY MASS INDEX: 27.99 KG/M2 | TEMPERATURE: 98 F | WEIGHT: 168 LBS | SYSTOLIC BLOOD PRESSURE: 110 MMHG | HEART RATE: 78 BPM | DIASTOLIC BLOOD PRESSURE: 74 MMHG

## 2025-07-15 DIAGNOSIS — Z34.90 PREGNANCY: ICD-10-CM

## 2025-07-15 DIAGNOSIS — Z3A.18 18 WEEKS GESTATION OF PREGNANCY: Primary | ICD-10-CM

## 2025-07-15 LAB
ALBUMIN SERPL-MCNC: 3.4 G/DL (ref 3.5–5)
ALBUMIN/GLOB SERPL: 0.9 RATIO (ref 1.1–2)
ALP SERPL-CCNC: 61 UNIT/L (ref 40–150)
ALT SERPL-CCNC: 27 UNIT/L (ref 0–55)
ANION GAP SERPL CALC-SCNC: 5 MEQ/L
AST SERPL-CCNC: 22 UNIT/L (ref 11–45)
BACTERIA #/AREA URNS AUTO: ABNORMAL /HPF
BASOPHILS # BLD AUTO: 0.04 X10(3)/MCL
BASOPHILS NFR BLD AUTO: 0.3 %
BILIRUB SERPL-MCNC: 0.4 MG/DL
BILIRUB SERPL-MCNC: NEGATIVE MG/DL
BILIRUB UR QL STRIP.AUTO: NEGATIVE
BLOOD URINE, POC: NEGATIVE
BUN SERPL-MCNC: 6.1 MG/DL (ref 7–18.7)
C TRACH DNA SPEC QL NAA+PROBE: NOT DETECTED
CALCIUM SERPL-MCNC: 9.3 MG/DL (ref 8.4–10.2)
CHLORIDE SERPL-SCNC: 106 MMOL/L (ref 98–107)
CLARITY UR: CLEAR
CLARITY, POC UA: CLEAR
CO2 SERPL-SCNC: 25 MMOL/L (ref 22–29)
COLOR UR AUTO: COLORLESS
COLOR, POC UA: YELLOW
CREAT SERPL-MCNC: 0.58 MG/DL (ref 0.55–1.02)
CREAT/UREA NIT SERPL: 11
EOSINOPHIL # BLD AUTO: 0.04 X10(3)/MCL (ref 0–0.9)
EOSINOPHIL NFR BLD AUTO: 0.3 %
ERYTHROCYTE [DISTWIDTH] IN BLOOD BY AUTOMATED COUNT: 12 % (ref 11.5–17)
GFR SERPLBLD CREATININE-BSD FMLA CKD-EPI: >60 ML/MIN/1.73/M2
GLOBULIN SER-MCNC: 3.8 GM/DL (ref 2.4–3.5)
GLUCOSE SERPL-MCNC: 68 MG/DL (ref 74–100)
GLUCOSE UR QL STRIP: NEGATIVE
GLUCOSE UR QL STRIP: NORMAL
GROUP & RH: NORMAL
HBV SURFACE AG SERPL QL IA: NONREACTIVE
HCT VFR BLD AUTO: 38.9 % (ref 37–47)
HCV AB SERPL QL IA: NONREACTIVE
HGB BLD-MCNC: 13.5 G/DL (ref 12–16)
HGB UR QL STRIP: NEGATIVE
HIV 1+2 AB+HIV1 P24 AG SERPL QL IA: NONREACTIVE
HYALINE CASTS #/AREA URNS LPF: ABNORMAL /LPF
IMM GRANULOCYTES # BLD AUTO: 0.07 X10(3)/MCL (ref 0–0.04)
IMM GRANULOCYTES NFR BLD AUTO: 0.5 %
INDIRECT COOMBS: NORMAL
KETONES UR QL STRIP: NEGATIVE
KETONES UR QL STRIP: NEGATIVE
LEUKOCYTE ESTERASE UR QL STRIP: NEGATIVE
LEUKOCYTE ESTERASE URINE, POC: NEGATIVE
LYMPHOCYTES # BLD AUTO: 1.42 X10(3)/MCL (ref 0.6–4.6)
LYMPHOCYTES NFR BLD AUTO: 9.4 %
MCH RBC QN AUTO: 32 PG (ref 27–31)
MCHC RBC AUTO-ENTMCNC: 34.7 G/DL (ref 33–36)
MCV RBC AUTO: 92.2 FL (ref 80–94)
MONOCYTES # BLD AUTO: 0.74 X10(3)/MCL (ref 0.1–1.3)
MONOCYTES NFR BLD AUTO: 4.9 %
MUCOUS THREADS URNS QL MICRO: ABNORMAL /LPF
N GONORRHOEA DNA SPEC QL NAA+PROBE: NOT DETECTED
NEUTROPHILS # BLD AUTO: 12.87 X10(3)/MCL (ref 2.1–9.2)
NEUTROPHILS NFR BLD AUTO: 84.6 %
NITRITE UR QL STRIP: NEGATIVE
NITRITE, POC UA: NEGATIVE
NRBC BLD AUTO-RTO: 0 %
PH UR STRIP: 7 [PH]
PH, POC UA: 7
PLATELET # BLD AUTO: 291 X10(3)/MCL (ref 130–400)
PMV BLD AUTO: 10.4 FL (ref 7.4–10.4)
POTASSIUM SERPL-SCNC: 3.7 MMOL/L (ref 3.5–5.1)
PROT SERPL-MCNC: 7.2 GM/DL (ref 6.4–8.3)
PROT UR QL STRIP: NEGATIVE
PROTEIN, POC: NEGATIVE
RBC # BLD AUTO: 4.22 X10(6)/MCL (ref 4.2–5.4)
RBC #/AREA URNS AUTO: ABNORMAL /HPF
SODIUM SERPL-SCNC: 136 MMOL/L (ref 136–145)
SP GR UR STRIP.AUTO: 1 (ref 1–1.03)
SPECIFIC GRAVITY, POC UA: 1.01
SPECIMEN OUTDATE: NORMAL
SPECIMEN SOURCE: NORMAL
SQUAMOUS #/AREA URNS LPF: ABNORMAL /HPF
T PALLIDUM AB SER QL: NONREACTIVE
UROBILINOGEN UR STRIP-ACNC: NORMAL
UROBILINOGEN, POC UA: 0.2
WBC # BLD AUTO: 15.18 X10(3)/MCL (ref 4.5–11.5)
WBC #/AREA URNS AUTO: ABNORMAL /HPF

## 2025-07-15 PROCEDURE — 81002 URINALYSIS NONAUTO W/O SCOPE: CPT | Mod: PBBFAC

## 2025-07-15 PROCEDURE — 85025 COMPLETE CBC W/AUTO DIFF WBC: CPT

## 2025-07-15 PROCEDURE — 87086 URINE CULTURE/COLONY COUNT: CPT

## 2025-07-15 PROCEDURE — 80053 COMPREHEN METABOLIC PANEL: CPT

## 2025-07-15 PROCEDURE — 87591 N.GONORRHOEAE DNA AMP PROB: CPT

## 2025-07-15 PROCEDURE — 99214 OFFICE O/P EST MOD 30 MIN: CPT | Mod: PBBFAC,25

## 2025-07-15 PROCEDURE — 76805 OB US >/= 14 WKS SNGL FETUS: CPT | Mod: TC

## 2025-07-15 PROCEDURE — 86780 TREPONEMA PALLIDUM: CPT

## 2025-07-15 PROCEDURE — 81015 MICROSCOPIC EXAM OF URINE: CPT

## 2025-07-15 PROCEDURE — 86850 RBC ANTIBODY SCREEN: CPT

## 2025-07-15 PROCEDURE — 85660 RBC SICKLE CELL TEST: CPT

## 2025-07-15 PROCEDURE — 86803 HEPATITIS C AB TEST: CPT

## 2025-07-15 PROCEDURE — 86762 RUBELLA ANTIBODY: CPT

## 2025-07-15 PROCEDURE — 86787 VARICELLA-ZOSTER ANTIBODY: CPT

## 2025-07-15 PROCEDURE — 87389 HIV-1 AG W/HIV-1&-2 AB AG IA: CPT

## 2025-07-15 PROCEDURE — 81511 FTL CGEN ABNOR FOUR ANAL: CPT

## 2025-07-15 PROCEDURE — 87340 HEPATITIS B SURFACE AG IA: CPT

## 2025-07-15 NOTE — PROGRESS NOTES
[facility] OB OFFICE VISIT NOTE     Primary PCP: Adrienne Wiggins DO    Chief Complaint     Maria E Alexander is a 20 y.o. female   at 18w0d with SABRINA of 2025, by Last Menstrual Period presenting to [facility] for initial OB visit.    HPI:   Denies pain, N/V, constipation, difficulty voiding. Has support from partner + his family. Pt's parents live in [place]. Takes PNV intermittently, will start taking daily. Works full time cleaning houses.     Relevant PMH:   -Bacterial vaginosis (25) - completed abx tx  -Pos chlamydia 2025 at outside OB office that was treated w/ neg f/u urine testing      Obstetrics History     OB History          1    Para   0    Term   0       0    AB   0    Living   0         SAB   0    IAB   0    Ectopic   0    Multiple   0    Live Births   0                  Gynecology History   - LMP: 3/11/2025  - Age at menarche: 11 years  - Menstrual hx: regular, 28 day cycles, 5 tampons/day, 5 days per period  - History of birth control: OCP  - History of STDs and/or Abnormal PAPs: treated BV and chlamydia in the past; denies abnormal PAP.  - History of prior : N/A    Medical History    Past Medical History: None  Surgical History: [place] teeth removed in high school  Family History: Mother - breast cancer; Father - renal cancer, seizure disorder; Paternal grandfather - stroke/MI  Social History: Denies smoking/vaping, illicit drug use, and alcohol use  Medications: Daily PNV    Review of Systems   Review of Systems   Constitutional:  Negative for chills and fever.   Respiratory:  Negative for shortness of breath and wheezing.    Cardiovascular:  Negative for palpitations and leg swelling.   Gastrointestinal:  Negative for abdominal pain, constipation, diarrhea, nausea and vomiting.   Genitourinary:  Negative for frequency, vaginal bleeding, vaginal discharge and vaginal pain.       Antepartum specific   - Fetal movements: Yes - daily  - Vaginal bleeding: No  - Vaginal  "discharge: No  - Loss of fluid: No  - Contractions: No  - Headaches: No  - Vision changes: No  - Edema: No    Vital Signs     Vitals:    07/15/25 1016   BP: 110/74   BP Location: Right arm   Patient Position: Sitting   Pulse: 78   Resp: 16   Temp: 97.9 °F (36.6 °C)   TempSrc: Oral   SpO2: 98%   Weight: 76.2 kg (168 lb)   Height: 5' 5" (1.651 m)       Physical Exam   General: in no acute distress  RESP: clear to auscultation bilaterally, non labored  CV: regular rate and rhythm, no murmurs, no edema  ABD: non-tender, BS+  FHTs: 151 bpm via initial OB U/S  Fundal height: palpated below umbilicus  Pelvic:  Chaperone present throughout duration of exam.  Normal external female anatomy without rashes or lesions. No cervical motion tenderness. Cervix visualized pink without lesions and closed. No vaginal bleeding or discharge noted.     Current Medications:      Current Outpatient Medications   Medication Instructions    PNV,calcium 72/iron/folic acid (PRENATAL VITAMIN) Tab 1 tablet, Oral, Daily       Labs   Urine dipstick:   Lab Results   Component Value Date    COLORU Yellow 07/15/2025    SPECGRAV 1.015 07/15/2025    PHUR 7.0 07/15/2025    WBCUR Negative 07/15/2025    NITRITE Negative 07/15/2025    PROTEINPOC Negative 07/15/2025    GLUCOSEUR Negative 07/15/2025    KETONESU Negative 07/15/2025    UROBILINOGEN 0.2 07/15/2025    BILIRUBINPOC Negative 07/15/2025    RBCUR Negative 07/15/2025       Initial OB Labs: (07/15/2025)  - Blood Type and Rh: O+  - Antibody Screen:  NEGATIVE  - CBC H/H:  13.5/38.9  - BUN/Cr:  6.1/0.58  - HIV:  PENDING  - RPR:  PENDING  - GC:  PENDING  - CT:  PENDING  - HBsAg:  PENDING  - HCVAb:  PENDING  - Rubella:  PENDING  - Varicella:  PENDING  - UA & Culture:  PENDING  - Sickle Cell Screen:  PENDING  - PAP: N/A  - Influenza vaccine date: N/A   - Previous  (N/A if not applicable): N/A  - BTL desired (N/A if not applicable): N/A    15-20 Weeks Lab: (07/15/2025)  - Quad Screen:  PENDING    28 " Week Labs: (Date)  - 1H GTT:   - Rhogam (N/A if not applicable):   - Date of Tdap:   - CBC H/H:   - RPR:   -  Consent Form Signed (N/A if not applicable): N/A  - BTL consent: N/A  - C for pediatric care: undecided     37 Week Labs: (Date)  - CBC H/H:   - RPR:   - GBS Culture:   - HIV:   - Cervical GC:   - Cervical Exam:     38 Weeks:  -Cervical Exam:     39 Weeks:  -Cervical Exam:  -NST Exam:    FM Continuity Patient: yes  Previous : no    Scheduled delivery: induction or  (can NOT be scheduled any earlier than 39w0d):    Imaging    Initial US (07/15/2025) :        Anatomy Scan:        Assessment     1. 18 weeks gestation of pregnancy        Plan     - OB Protocol discussed  - daily PNVs  - Urine dip reviewed as above  - Routine OB labs reviewed as above  - Mother plans to breast and/or bottle feed  - Postpartum contraception discussion: undecided  - ED precautions discussed: vaginal bleeding or leaking fluid, belly cramping or pain, SOB/chest pain, swelling of the face/lower extremities, vision changes. If don't feel the baby move in over an hour. Severe headache that are not resolved with medication       [facility] with a [facility] scheduled in 2 weeks. Will fax over initial OB lab work and clinic notes [facility] per patient request.       Adrienne Wiggins DO  [facility], -3

## 2025-07-15 NOTE — PATIENT INSTRUCTIONS
Well Child Exam    About this topic  A well child exam is a visit with your child's doctor to check your child's health. The doctor will check your child's growth, progress, and shot record. It is also a time for you to ask your child's doctor any questions you have about your child's health. Your child will have a full exam during the office visit. Other things that are sometimes checked are hearing, eyesight, and urine or blood tests. The doctor may give shots during your child's well visit.    General    Getting Ready for a Well Child Exam    A well child exam is a good time for you to talk with your child's doctor about any of these topics:    Eating habits or diet    How your child acts    Sleep issues    Growth    Safety    Vaccines    Toilet training    Teen years    How your child is doing in school or any learning concerns    Home life    You may want to make a written list of the things you want to talk about with your child's doctor. Be sure to bring your list of questions to your child's well visit. You may also want to do some research on your own before your office visit by reading books or looking at Web sites. Other family members, child caregivers, and grandparents may be able to help you too. Your child's doctor may ask also you about your family's health history or if your child is around anyone who smokes.    The Exam    The doctor measures your child's weight, height, and sometimes head size or body mass index (BMI). The doctor plots these numbers on a growth curve. The growth curve gives a picture of your baby's growth at each visit. The doctor may check your child's temperature, blood pressure, breathing, and heart rate. The doctor may listen to your child's heart, lungs, and belly. Your doctor will do a full exam of your child from the head to the toes.    Growth and Development Questions    Your doctor will ask you about your child's progress. The doctor will focus on the skills that are  likely to happen at your child's age. Some of these are motor skills like rolling over, walking, and running, while others are social skills, or how your child interacts with other people. Your child's doctor will also ask you how your child is doing in school.    Help for Parents    Your doctor will talk with you about any concerns you have about your child during this visit. The doctor may also talk with you about:    Getting family help or other support    Ways to help your child's brain growth    How your child plays and acts with others    Ways to help your child exercise    Safety    Eating habits    Vaccines    Quitting smoking    Help if you have a low mood after having a baby    Shots or Vaccines    It is important for your child to get shots on time. This protects from very serious illnesses like pertussis, measles, or some kinds of pneumonia. Sometimes, your child may need more than one dose of vaccine. The vaccines used today are safer than ever. Talk to your doctor if you have any questions or concerns about giving your child vaccines.    Well Child Exam Schedule    The American Academy of Pediatrics (AAP) suggests this plan for well child visits:    [place] (3 to 5 days old)    1 month old    2 months old    4 months old    6 months old    9 months old    12 months old    15 months old    18 months old    2 years old    30 months old    3 years old    4 years old    Once each year until age 21    Well child exams are very important. Since your child is healthy at this visit and it is scheduled ahead of time, you can think about things you want to ask your child's doctor. Be sure to follow the above plan for well child visits as well as any other visits your child's doctor suggests.    Where can I learn more?    Centers for Disease Control and Prevention    http://www.cdc.gov/vaccines     Healthy  Children    https://www.healthychildren.org/English/family-life/health-management/Pages/Well-Child-Care-A-Check-Up-for-Success.aspx    Disclaimer.  This generalized information is a limited summary of diagnosis, treatment, and/or medication information. It is not meant to be comprehensive and should be used as a tool to help the user understand and/or assess potential diagnostic and treatment options. It does NOT include all information about conditions, treatments, medications, side effects, or risks that may apply to a specific patient. It is not intended to be medical advice or a substitute for the medical advice, diagnosis, or treatment of a health care provider based on the health care provider's examination and assessment of a patients specific and unique circumstances. Patients must speak with a health care provider for complete information about their health, medical questions, and treatment options, including any risks or benefits regarding use of medications. This information does not endorse any treatments or medications as safe, effective, or approved for treating a specific patient. UpToDate, Inc. and its affiliates disclaim any warranty or liability relating to this information or the use thereof. The use of this information is governed by the Terms of Use, available at Terms of Use. ©2022 UpToDate, Inc. and its affiliates and/or licensors. All rights reserved.

## 2025-07-15 NOTE — TELEPHONE ENCOUNTER
Called patient to discuss initial OB lab results with Hep B and Hep C and sickle cell screening still pending at this time.  Patient voiced her understanding of results and patient's questions answered at this time.      Adrienne Wiggins, DO

## 2025-07-16 LAB
HGB S BLD QL SOLY: NEGATIVE
RUBV IGG SERPL IA-ACNC: 0.9
RUBV IGG SERPL QL IA: NORMAL

## 2025-07-17 ENCOUNTER — TELEPHONE (OUTPATIENT)
Dept: FAMILY MEDICINE | Facility: CLINIC | Age: 20
End: 2025-07-17
Payer: MEDICAID

## 2025-07-17 LAB
# FETUSES: 1
2ND TRIMESTER 4 SCREEN SERPL-IMP: NORMAL
AFP ADJ MOM SERPL: 1.26 MOM
AFP SERPL IA-MCNC: 51.4 NG/ML
AGE AT DELIVERY: NORMAL
B-HCG ADJ MOM SERPL: 0.92 MOM
BACTERIA UR CULT: NO GROWTH
CHORION TYPE: NORMAL
COLLECT DATE: NORMAL
CURRENT SMOKER: NORMAL
FET TS 21 RISK FROM MAT AGE: NORMAL
GA EST FROM LMP: NORMAL WK,D
GA METHOD: NORMAL
HCG SERPL IA-ACNC: 21.4 IU/ML
HX OF NTD QL: NO
HX OF NTD QL: NO
HX OF TRISOMY 21 QL: NO
IDDM PATIENT QL: NO
INHIBIN A ADJ MOM SERPL: 0.7 MOM
INHIBIN SERPL-MCNC: 96 PG/ML
IVF PREGNANCY: NO
LABORATORY COMMENT REPORT: NORMAL
M PHYSICIAN PHONE NUMBER: NORMAL
MATERNAL RISK FACTORS: NORMAL
NEURAL TUBE DEFECT RISK FETUS: NORMAL %
RECOM F/U: NORMAL
TEST PERFORMANCE INFO SPEC: NORMAL
TS 18 RISK FETUS: NORMAL
TS 21 RISK FETUS: NORMAL
U ESTRIOL ADJ MOM SERPL: 1.07 MOM
U ESTRIOL SERPL-MCNC: 1.74 NG/ML
VZV IGG SER IA-ACNC: 1.1
VZV IGG SER QL IA: POSITIVE

## 2025-07-17 NOTE — PROGRESS NOTES
I have personally reviewed the review of systems (ROS) and past, family and social histories (PFSH) documented above by the resident.  I have reviewed the care furnished by the resident during the encounter, including a review of the patient's medical history, the resident's findings on physical examination, diagnosis, and the treatment plan.  I participated in the management of the patient and was immediately available throughout the encounter.   I was physically present during all key portions of the service(s) provided with the resident.  Services were furnished in a primary care center located in the outpatient department of a [facility] [address] [facility].

## 2025-07-17 NOTE — TELEPHONE ENCOUNTER
Spoke with patient over telephone regarding initial OB lab results. All questions answered at this time.      Adrienne Wiggins, DO

## 2025-08-25 ENCOUNTER — PATIENT MESSAGE (OUTPATIENT)
Dept: FAMILY MEDICINE | Facility: CLINIC | Age: 20
End: 2025-08-25
Payer: MEDICAID

## 2025-08-26 DIAGNOSIS — R11.0 NAUSEA: ICD-10-CM

## 2025-08-26 DIAGNOSIS — Z32.01 POSITIVE URINE PREGNANCY TEST: ICD-10-CM

## 2025-08-26 RX ORDER — PRENATAL WITH FERROUS FUM AND FOLIC ACID 3080; 920; 120; 400; 22; 1.84; 3; 20; 10; 1; 12; 200; 27; 25; 2 [IU]/1; [IU]/1; MG/1; [IU]/1; MG/1; MG/1; MG/1; MG/1; MG/1; MG/1; UG/1; MG/1; MG/1; MG/1; MG/1
1 TABLET ORAL DAILY
Qty: 30 TABLET | Refills: 11 | Status: SHIPPED | OUTPATIENT
Start: 2025-08-26 | End: 2026-08-26